# Patient Record
Sex: MALE | Race: AMERICAN INDIAN OR ALASKA NATIVE | HISPANIC OR LATINO | Employment: STUDENT | URBAN - METROPOLITAN AREA
[De-identification: names, ages, dates, MRNs, and addresses within clinical notes are randomized per-mention and may not be internally consistent; named-entity substitution may affect disease eponyms.]

---
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
## 2023-06-04 ENCOUNTER — OFFICE VISIT (OUTPATIENT)
Dept: URGENT CARE | Age: 23
End: 2023-06-04
Payer: COMMERCIAL

## 2023-06-04 VITALS
SYSTOLIC BLOOD PRESSURE: 122 MMHG | TEMPERATURE: 98.2 F | OXYGEN SATURATION: 98 % | RESPIRATION RATE: 18 BRPM | HEART RATE: 73 BPM | DIASTOLIC BLOOD PRESSURE: 67 MMHG

## 2023-06-04 DIAGNOSIS — S91.114A LACERATION OF LESSER TOE OF RIGHT FOOT WITHOUT FOREIGN BODY PRESENT OR DAMAGE TO NAIL, INITIAL ENCOUNTER: Primary | ICD-10-CM

## 2023-06-04 PROCEDURE — 99213 OFFICE O/P EST LOW 20 MIN: CPT | Performed by: PHYSICIAN ASSISTANT

## 2023-06-04 RX ORDER — CEPHALEXIN 500 MG/1
500 CAPSULE ORAL EVERY 6 HOURS SCHEDULED
Qty: 20 CAPSULE | Refills: 0 | Status: SHIPPED | OUTPATIENT
Start: 2023-06-04 | End: 2023-06-09

## 2023-06-04 NOTE — LETTER
June 4, 2023     Patient: Vanessa Garcia   YOB: 2000   Date of Visit: 6/4/2023       To Whom it May Concern:    Giovanna Sharma was seen in my clinic on 6/4/2023  He may return to work on 6/5/2023  If you have any questions or concerns, please don't hesitate to call           Sincerely,          [facility]        CC: No Recipients
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
## 2023-06-04 NOTE — PATIENT INSTRUCTIONS
Laceration toe  Keflex as directed  Follow up with PCP in 3-5 days  Proceed to  ER if symptoms worsen